# Patient Record
(demographics unavailable — no encounter records)

---

## 2025-01-31 NOTE — REASON FOR VISIT
[Follow-Up Evaluation] : a follow-up evaluation for [Seizure Disorder] : seizure disorder [Father] : father [Medical Records] : medical records

## 2025-01-31 NOTE — PHYSICAL EXAM
[Well-appearing] : well-appearing [No dysmorphic facial features] : no dysmorphic facial features [No ocular abnormalities] : no ocular abnormalities [Neck supple] : neck supple [No organomegaly] : no organomegaly [No deformities] : no deformities [Alert] : alert [Well related, good eye contact] : well related, good eye contact [Conversant] : conversant [Normal speech and language] : normal speech and language [Follows instructions well] : follows instructions well [Full extraocular movements] : full extraocular movements [No papilledema] : no papilledema [Normal facial sensation to light touch] : normal facial sensation to light touch [No facial asymmetry or weakness] : no facial asymmetry or weakness [Gross hearing intact] : gross hearing intact [Normal tongue movement] : normal tongue movement [Midline tongue, no fasciculations] : midline tongue, no fasciculations [Normal axial and appendicular muscle tone] : normal axial and appendicular muscle tone [Gets up on table without difficulty] : gets up on table without difficulty [5/5 strength in proximal and distal muscles of arms and legs] : 5/5 strength in proximal and distal muscles of arms and legs [Walks and runs well] : walks and runs well [Able to walk on heels] : able to walk on heels [2+ biceps] : 2+ biceps [Triceps] : triceps [Knee jerks] : knee jerks [Ankle jerks] : ankle jerks [No ankle clonus] : no ankle clonus [Bilaterally] : bilaterally [Localizes LT and temperature] : localizes LT and temperature [No dysmetria on FTNT] : no dysmetria on FTNT [Normal gait] : normal gait [de-identified] : l

## 2025-01-31 NOTE — DATA REVIEWED
[FreeTextEntry1] : 2/1/17- REEG- normal\par  \par  1/2015- VEEG- abnormal- right centro temporal spikes

## 2025-01-31 NOTE — DISCUSSION/SUMMARY
[FreeTextEntry1] : Medical Accommodations Request form completed and given to provider support staff, Malcolm Moreno to obtain MD signature and email.

## 2025-01-31 NOTE — HISTORY OF PRESENT ILLNESS
[None] : The patient is currently asymptomatic [FreeTextEntry1] : 6/18/2015  accompanied by his father. The child was first admitted on 1/28/2015 with a new onset of his seizure. He was sitting near his parents when his eyes rolled back his face began to jerk and he was drooling and unresponsive the episode lasted for about 15 seconds. His brain MRI then was normal and a VEEG showed right central temporal spikes. He was started on Trileptal 300 mg/5 mL, 2 mL twice daily. He had at least 2 additional seizures following that admission and he was readmitted on 6/17/2015 with a third seizure.  The last seizure occurred when he began twitching the right side of his mouth, drooling, and was somewhat confused. Trileptal was increased to 2.5 mL twice daily.   10/1/2015  accompanied by his father. Has remained seizure-free on Trileptal 300 mg/5 mL, 2.5 mL twice daily. No new clinical concerns or complaints  1/12/2016 with Dad. Last seizure 5/2015. On Trileptal 300mg/5mL  2.5 ML BID  Doing well in [employer].  November 16, 2016   accompanied by the father.   7/9/19- with father; He has been doing well. On Trileptal 300 mg/5 mL, 3 mL at bedtime. Last seizure January 2015. Doing well socially and in school. Just finished 3rd grade and did well.  9/26/2019: with father. Had a few minutes seizure on 9/9/19. Taken to [facility] ED where Oxcarbazepine 300mg/5ML was increased to 5ML BID.    1/8/20- with father; On 1/1/20 [place] had a full body jerk twice within 2 minutes. Has other wise been doing very well. Taking OXC 300mg/5mL , 6 mL BID. No side effects reports. Currently in 4th grade and is doing well.  10/19/2020- with father on telehealth; Has not had any jerking since last visit. He does have drooling sometimes at night as per Dad. He will wake up fine in the morning and rested. Taking  OXC 300mg/5mL , 6 mL BID. no side effects reported. [place] sleeps with Dad at night because they want to watch him closely while he sleeps. currently in 5th grade and is doing well.  1/14/2020  with his father. Well controlled seizures on Oxcarbazepine 300mg/5ML, 9ML BID. MRI results discussed: subtle increased signal intensity noted over the left hippocampus possibly edema secondary to seizures or early stages mesial temporal sclerosis  4/13/2021  with his father in a Telehealth visit. [place] remains seizure free on Oxcarbazepine 300mg/5ML, 9MLs BID.  9/9/2021: seen with father in office. Well controlled seizures on Oxcarbazepine 300mg/5mL 9mLs BID (30mg/kg/day). Last seizure 2019 when wee tried to take him off medication. No issues with sleep at this time, no nausea or vomiting. No headaches. New school starting next Monday, 9/13, [id number] [employer] entering 6th grade (in-person).   12/16/2021 9/9/2021: seen with father in office. Well controlled seizures on Oxcarbazepine 300mg/5mL 9mLs BID (30mg/kg/day). No issues with sleep at this time, no nausea or vomiting. No headaches.  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   5/3/2022  seen with father in office. Well controlled seizures on Oxcarbazepine 300mg/5mL 9mLs BID (30mg/kg/day). No issues with sleep at this time, no nausea or vomiting. No headaches.  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   9/22/2022  seen with father in office. Well controlled seizures on Oxcarbazepine 300mg/5mL 9mLs BID (30mg/kg/day).  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   4/21/23  seen with father in office. Well controlled seizures on Oxcarbazepine 300mg/5mL 9mLs BID (30mg/kg/day).  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 Father noted on few occasions saliva on the pillow.    10/20/2023   seen with father in office. Well controlled seizures on Oxcarbazepine 300mg/5mL 9mLs BID (30mg/kg/day).  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 Father noted on few occasions of saliva on the pillow.    3/22/2024  een with father in office. Well controlled seizures on Oxcarbazepine 300mg/5mL 9mLs BID (30mg/kg/day).  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 Father noted on few occasions of saliva on the pillow as previously reported.   1/31/2025  with the father. Well controlled seizures on Oxcarbazepine 300mg/5mL 9mLs BID (30mg/kg/day).  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 Father noted on few occasions of saliva on the pillow as previously reported.

## 2025-01-31 NOTE — REVIEW OF SYSTEMS
[Patient Intake Form Reviewed] : patient intake form reviewed [Seizure] : seizures [Normal] : Psychiatric [FreeTextEntry8] : see HPI

## 2025-01-31 NOTE — CONSULT LETTER
[Dear  ___] : Dear  [unfilled], [Consult Letter:] : I had the pleasure of evaluating your patient, [unfilled]. [( Thank you for referring [unfilled] for consultation for _____ )] : Thank you for referring [unfilled] for consultation for [unfilled] [Please see my note below.] : Please see my note below. [Consult Closing:] : Thank you very much for allowing me to participate in the care of this patient.  If you have any questions, please do not hesitate to contact me. [Sincerely,] : Sincerely, [FreeTextEntry3] : Dr. Stephan Jimenez, PGY-4\par  Pediatric Neurology\par

## 2025-03-14 NOTE — PHYSICAL EXAM
[Well-appearing] : well-appearing [No dysmorphic facial features] : no dysmorphic facial features [No ocular abnormalities] : no ocular abnormalities [Neck supple] : neck supple [No organomegaly] : no organomegaly [No deformities] : no deformities [Alert] : alert [Well related, good eye contact] : well related, good eye contact [Conversant] : conversant [Normal speech and language] : normal speech and language [Follows instructions well] : follows instructions well [Full extraocular movements] : full extraocular movements [No papilledema] : no papilledema [Normal facial sensation to light touch] : normal facial sensation to light touch [No facial asymmetry or weakness] : no facial asymmetry or weakness [Gross hearing intact] : gross hearing intact [Normal tongue movement] : normal tongue movement [Midline tongue, no fasciculations] : midline tongue, no fasciculations [Normal axial and appendicular muscle tone] : normal axial and appendicular muscle tone [Gets up on table without difficulty] : gets up on table without difficulty [5/5 strength in proximal and distal muscles of arms and legs] : 5/5 strength in proximal and distal muscles of arms and legs [Walks and runs well] : walks and runs well [Able to walk on heels] : able to walk on heels [2+ biceps] : 2+ biceps [Triceps] : triceps [Knee jerks] : knee jerks [Ankle jerks] : ankle jerks [No ankle clonus] : no ankle clonus [Bilaterally] : bilaterally [Localizes LT and temperature] : localizes LT and temperature [No dysmetria on FTNT] : no dysmetria on FTNT [Normal gait] : normal gait [de-identified] : l

## 2025-03-14 NOTE — HISTORY OF PRESENT ILLNESS
[None] : The patient is currently asymptomatic [FreeTextEntry1] : 6/18/2015  accompanied by his father. The child was first admitted on 1/28/2015 with a new onset of his seizure. He was sitting near his parents when his eyes rolled back his face began to jerk and he was drooling and unresponsive the episode lasted for about 15 seconds. His brain MRI then was normal and a VEEG showed right central temporal spikes. He was started on Trileptal 300 mg/5 mL, 2 mL twice daily. He had at least 2 additional seizures following that admission and he was readmitted on 6/17/2015 with a third seizure.  The last seizure occurred when he began twitching the right side of his mouth, drooling, and was somewhat confused. Trileptal was increased to 2.5 mL twice daily.   10/1/2015  accompanied by his father. Has remained seizure-free on Trileptal 300 mg/5 mL, 2.5 mL twice daily. No new clinical concerns or complaints  1/12/2016 with Dad. Last seizure 5/2015. On Trileptal 300mg/5mL  2.5 ML BID  Doing well in [employer].  November 16, 2016   accompanied by the father.   7/9/19- with father; He has been doing well. On Trileptal 300 mg/5 mL, 3 mL at bedtime. Last seizure January 2015. Doing well socially and in school. Just finished 3rd grade and did well.  9/26/2019: with father. Had a few minutes seizure on 9/9/19. Taken to [facility] ED where Oxcarbazepine 300mg/5ML was increased to 5ML BID.    1/8/20- with father; On 1/1/20 [place] had a full body jerk twice within 2 minutes. Has other wise been doing very well. Taking OXC 300mg/5mL , 6 mL BID. No side effects reports. Currently in 4th grade and is doing well.  10/19/2020- with father on telehealth; Has not had any jerking since last visit. He does have drooling sometimes at night as per Dad. He will wake up fine in the morning and rested. Taking  OXC 300mg/5mL , 6 mL BID. no side effects reported. [place] sleeps with Dad at night because they want to watch him closely while he sleeps. currently in 5th grade and is doing well.  1/14/2020  with his father. Well controlled seizures on Oxcarbazepine 300mg/5ML, 9ML BID. MRI results discussed: subtle increased signal intensity noted over the left hippocampus possibly edema secondary to seizures or early stages mesial temporal sclerosis  4/13/2021  with his father in a Telehealth visit. [place] remains seizure free on Oxcarbazepine 300mg/5ML, 9MLs BID.  9/9/2021: seen with father in office. Well controlled seizures on Oxcarbazepine 300mg/5mL 9mLs BID (30mg/kg/day). Last seizure 2019 when wee tried to take him off medication. No issues with sleep at this time, no nausea or vomiting. No headaches. New school starting next Monday, 9/13, [id number] [employer] school entering 6th grade (in-person).   12/16/2021 9/9/2021: seen with father in office. Well controlled seizures on Oxcarbazepine 300mg/5mL 9mLs BID (30mg/kg/day). No issues with sleep at this time, no nausea or vomiting. No headaches.  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   5/3/2022  seen with father in office. Well controlled seizures on Oxcarbazepine 300mg/5mL 9mLs BID (30mg/kg/day). No issues with sleep at this time, no nausea or vomiting. No headaches.  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   9/22/2022  seen with father in office. Well controlled seizures on Oxcarbazepine 300mg/5mL 9mLs BID (30mg/kg/day).  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   4/21/23  seen with father in office. Well controlled seizures on Oxcarbazepine 300mg/5mL 9mLs BID (30mg/kg/day).  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 Father noted on few occasions saliva on the pillow.    10/20/2023   seen with father in office. Well controlled seizures on Oxcarbazepine 300mg/5mL 9mLs BID (30mg/kg/day).  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 Father noted on few occasions of saliva on the pillow.    3/22/2024  een with father in office. Well controlled seizures on Oxcarbazepine 300mg/5mL 9mLs BID (30mg/kg/day).  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 Father noted on few occasions of saliva on the pillow as previously reported.   1/31/2025  with the father. Well controlled seizures on Oxcarbazepine 300mg/5mL 9mLs BID (30mg/kg/day).  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 Father noted on few occasions of saliva on the pillow as previously reported.   3/14/2025  with the [facility]. Had a preliminary read  normal REEG. On Oxcarbazepine  9MLs BID

## 2025-03-14 NOTE — PLAN
[FreeTextEntry1] : F/U in 1 month to discuss the results. Weaning consideration vis a vis  mesial temporal sclerosis on MRI

## 2025-04-25 NOTE — HISTORY OF PRESENT ILLNESS
[None] : The patient is currently asymptomatic [FreeTextEntry1] : 6/18/2015  accompanied by his father. The child was first admitted on 1/28/2015 with a new onset of his seizure. He was sitting near his parents when his eyes rolled back his face began to jerk and he was drooling and unresponsive the episode lasted for about 15 seconds. His brain MRI then was normal and a VEEG showed right central temporal spikes. He was started on Trileptal 300 mg/5 mL, 2 mL twice daily. He had at least 2 additional seizures following that admission and he was readmitted on 6/17/2015 with a third seizure.  The last seizure occurred when he began twitching the right side of his mouth, drooling, and was somewhat confused. Trileptal was increased to 2.5 mL twice daily.   10/1/2015  accompanied by his father. Has remained seizure-free on Trileptal 300 mg/5 mL, 2.5 mL twice daily. No new clinical concerns or complaints  1/12/2016 with Dad. Last seizure 5/2015. On Trileptal 300mg/5mL  2.5 ML BID  Doing well in [employer].  November 16, 2016   accompanied by the father.   7/9/19- with father; He has been doing well. On Trileptal 300 mg/5 mL, 3 mL at bedtime. Last seizure January 2015. Doing well socially and in school. Just finished 3rd grade and did well.  9/26/2019: with father. Had a few minutes seizure on 9/9/19. Taken to [facility] ED where Oxcarbazepine 300mg/5ML was increased to 5ML BID.    1/8/20- with father; On 1/1/20 [place] had a full body jerk twice within 2 minutes. Has other wise been doing very well. Taking OXC 300mg/5mL , 6 mL BID. No side effects reports. Currently in 4th grade and is doing well.  10/19/2020- with father on telehealth; Has not had any jerking since last visit. He does have drooling sometimes at night as per Dad. He will wake up fine in the morning and rested. Taking  OXC 300mg/5mL , 6 mL BID. no side effects reported. [place] sleeps with Dad at night because they want to watch him closely while he sleeps. currently in 5th grade and is doing well.  1/14/2020  with his father. Well controlled seizures on Oxcarbazepine 300mg/5ML, 9ML BID. MRI results discussed: subtle increased signal intensity noted over the left hippocampus possibly edema secondary to seizures or early stages mesial temporal sclerosis  4/13/2021  with his father in a Telehealth visit. [place] remains seizure free on Oxcarbazepine 300mg/5ML, 9MLs BID.  9/9/2021: seen with father in office. Well controlled seizures on Oxcarbazepine 300mg/5mL 9mLs BID (30mg/kg/day). Last seizure 2019 when wee tried to take him off medication. No issues with sleep at this time, no nausea or vomiting. No headaches. New school starting next Monday, 9/13, [id number] [employer] school entering 6th grade (in-person).   12/16/2021 9/9/2021: seen with father in office. Well controlled seizures on Oxcarbazepine 300mg/5mL 9mLs BID (30mg/kg/day). No issues with sleep at this time, no nausea or vomiting. No headaches.  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   5/3/2022  seen with father in office. Well controlled seizures on Oxcarbazepine 300mg/5mL 9mLs BID (30mg/kg/day). No issues with sleep at this time, no nausea or vomiting. No headaches.  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   9/22/2022  seen with father in office. Well controlled seizures on Oxcarbazepine 300mg/5mL 9mLs BID (30mg/kg/day).  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   4/21/23  seen with father in office. Well controlled seizures on Oxcarbazepine 300mg/5mL 9mLs BID (30mg/kg/day).  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 Father noted on few occasions saliva on the pillow.    10/20/2023   seen with father in office. Well controlled seizures on Oxcarbazepine 300mg/5mL 9mLs BID (30mg/kg/day).  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 Father noted on few occasions of saliva on the pillow.    3/22/2024  een with father in office. Well controlled seizures on Oxcarbazepine 300mg/5mL 9mLs BID (30mg/kg/day).  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 Father noted on few occasions of saliva on the pillow as previously reported.   1/31/2025  with the father. Well controlled seizures on Oxcarbazepine 300mg/5mL 9mLs BID (30mg/kg/day).  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 Father noted on few occasions of saliva on the pillow as previously reported.   4/25/2025 with the father. Well controlled seizures on Oxcarbazepine 300mg/5mL 9mLs BID (30mg/kg/day).  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 Father noted on few occasions of saliva on the pillow as previously reported. AEEG done recently was normal. No recent seizures were reported

## 2025-04-25 NOTE — PHYSICAL EXAM
[Well-appearing] : well-appearing [No dysmorphic facial features] : no dysmorphic facial features [No ocular abnormalities] : no ocular abnormalities [Neck supple] : neck supple [No organomegaly] : no organomegaly [No deformities] : no deformities [Alert] : alert [Well related, good eye contact] : well related, good eye contact [Conversant] : conversant [Normal speech and language] : normal speech and language [Follows instructions well] : follows instructions well [Full extraocular movements] : full extraocular movements [No papilledema] : no papilledema [Normal facial sensation to light touch] : normal facial sensation to light touch [No facial asymmetry or weakness] : no facial asymmetry or weakness [Gross hearing intact] : gross hearing intact [Normal tongue movement] : normal tongue movement [Midline tongue, no fasciculations] : midline tongue, no fasciculations [Normal axial and appendicular muscle tone] : normal axial and appendicular muscle tone [Gets up on table without difficulty] : gets up on table without difficulty [5/5 strength in proximal and distal muscles of arms and legs] : 5/5 strength in proximal and distal muscles of arms and legs [Walks and runs well] : walks and runs well [Able to walk on heels] : able to walk on heels [2+ biceps] : 2+ biceps [Triceps] : triceps [Knee jerks] : knee jerks [Ankle jerks] : ankle jerks [No ankle clonus] : no ankle clonus [Bilaterally] : bilaterally [Localizes LT and temperature] : localizes LT and temperature [No dysmetria on FTNT] : no dysmetria on FTNT [Normal gait] : normal gait [de-identified] : l

## 2025-04-25 NOTE — PLAN
[FreeTextEntry1] : Risk of seizure recurrence of  25% when aiming for a complete wean of the Oxcarbazepine was discussed.  Seizure precautions were discussed.

## 2025-04-25 NOTE — HISTORY OF PRESENT ILLNESS
[None] : The patient is currently asymptomatic [FreeTextEntry1] : 6/18/2015  accompanied by his father. The child was first admitted on 1/28/2015 with a new onset of his seizure. He was sitting near his parents when his eyes rolled back his face began to jerk and he was drooling and unresponsive the episode lasted for about 15 seconds. His brain MRI then was normal and a VEEG showed right central temporal spikes. He was started on Trileptal 300 mg/5 mL, 2 mL twice daily. He had at least 2 additional seizures following that admission and he was readmitted on 6/17/2015 with a third seizure.  The last seizure occurred when he began twitching the right side of his mouth, drooling, and was somewhat confused. Trileptal was increased to 2.5 mL twice daily.   10/1/2015  accompanied by his father. Has remained seizure-free on Trileptal 300 mg/5 mL, 2.5 mL twice daily. No new clinical concerns or complaints  1/12/2016 with Dad. Last seizure 5/2015. On Trileptal 300mg/5mL  2.5 ML BID  Doing well in [employer].  November 16, 2016   accompanied by the father.   7/9/19- with father; He has been doing well. On Trileptal 300 mg/5 mL, 3 mL at bedtime. Last seizure January 2015. Doing well socially and in school. Just finished 3rd grade and did well.  9/26/2019: with father. Had a few minutes seizure on 9/9/19. Taken to [facility] ED where Oxcarbazepine 300mg/5ML was increased to 5ML BID.    1/8/20- with father; On 1/1/20 [place] had a full body jerk twice within 2 minutes. Has other wise been doing very well. Taking OXC 300mg/5mL , 6 mL BID. No side effects reports. Currently in 4th grade and is doing well.  10/19/2020- with father on telehealth; Has not had any jerking since last visit. He does have drooling sometimes at night as per Dad. He will wake up fine in the morning and rested. Taking  OXC 300mg/5mL , 6 mL BID. no side effects reported. [place] sleeps with Dad at night because they want to watch him closely while he sleeps. currently in 5th grade and is doing well.  1/14/2020  with his father. Well controlled seizures on Oxcarbazepine 300mg/5ML, 9ML BID. MRI results discussed: subtle increased signal intensity noted over the left hippocampus possibly edema secondary to seizures or early stages mesial temporal sclerosis  4/13/2021  with his father in a Telehealth visit. [place] remains seizure free on Oxcarbazepine 300mg/5ML, 9MLs BID.  9/9/2021: seen with father in office. Well controlled seizures on Oxcarbazepine 300mg/5mL 9mLs BID (30mg/kg/day). Last seizure 2019 when wee tried to take him off medication. No issues with sleep at this time, no nausea or vomiting. No headaches. New school starting next Monday, 9/13, [id number] [employer] school entering 6th grade (in-person).   12/16/2021 9/9/2021: seen with father in office. Well controlled seizures on Oxcarbazepine 300mg/5mL 9mLs BID (30mg/kg/day). No issues with sleep at this time, no nausea or vomiting. No headaches.  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   5/3/2022  seen with father in office. Well controlled seizures on Oxcarbazepine 300mg/5mL 9mLs BID (30mg/kg/day). No issues with sleep at this time, no nausea or vomiting. No headaches.  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   9/22/2022  seen with father in office. Well controlled seizures on Oxcarbazepine 300mg/5mL 9mLs BID (30mg/kg/day).  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   4/21/23  seen with father in office. Well controlled seizures on Oxcarbazepine 300mg/5mL 9mLs BID (30mg/kg/day).  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 Father noted on few occasions saliva on the pillow.    10/20/2023   seen with father in office. Well controlled seizures on Oxcarbazepine 300mg/5mL 9mLs BID (30mg/kg/day).  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 Father noted on few occasions of saliva on the pillow.    3/22/2024  een with father in office. Well controlled seizures on Oxcarbazepine 300mg/5mL 9mLs BID (30mg/kg/day).  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 Father noted on few occasions of saliva on the pillow as previously reported.   1/31/2025  with the father. Well controlled seizures on Oxcarbazepine 300mg/5mL 9mLs BID (30mg/kg/day).  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 Father noted on few occasions of saliva on the pillow as previously reported.   4/25/2025 with the father. Well controlled seizures on Oxcarbazepine 300mg/5mL 9mLs BID (30mg/kg/day).  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 Father noted on few occasions of saliva on the pillow as previously reported. AEEG done recently was normal. No recent seizures were reported

## 2025-04-25 NOTE — PHYSICAL EXAM
[Well-appearing] : well-appearing [No dysmorphic facial features] : no dysmorphic facial features [No ocular abnormalities] : no ocular abnormalities [Neck supple] : neck supple [No organomegaly] : no organomegaly [No deformities] : no deformities [Alert] : alert [Well related, good eye contact] : well related, good eye contact [Conversant] : conversant [Normal speech and language] : normal speech and language [Follows instructions well] : follows instructions well [Full extraocular movements] : full extraocular movements [No papilledema] : no papilledema [Normal facial sensation to light touch] : normal facial sensation to light touch [No facial asymmetry or weakness] : no facial asymmetry or weakness [Gross hearing intact] : gross hearing intact [Normal tongue movement] : normal tongue movement [Midline tongue, no fasciculations] : midline tongue, no fasciculations [Normal axial and appendicular muscle tone] : normal axial and appendicular muscle tone [Gets up on table without difficulty] : gets up on table without difficulty [5/5 strength in proximal and distal muscles of arms and legs] : 5/5 strength in proximal and distal muscles of arms and legs [Walks and runs well] : walks and runs well [Able to walk on heels] : able to walk on heels [2+ biceps] : 2+ biceps [Triceps] : triceps [Knee jerks] : knee jerks [Ankle jerks] : ankle jerks [No ankle clonus] : no ankle clonus [Bilaterally] : bilaterally [Localizes LT and temperature] : localizes LT and temperature [No dysmetria on FTNT] : no dysmetria on FTNT [Normal gait] : normal gait [de-identified] : l